# Patient Record
Sex: MALE | Race: WHITE | NOT HISPANIC OR LATINO | Employment: UNEMPLOYED | URBAN - METROPOLITAN AREA
[De-identification: names, ages, dates, MRNs, and addresses within clinical notes are randomized per-mention and may not be internally consistent; named-entity substitution may affect disease eponyms.]

---
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
## 2021-08-22 NOTE — TELEPHONE ENCOUNTER
----- Message from Myron Schulte II, DO sent at 7/29/2020 12:22 PM EDT -----  Please call pt and let him know I would like for him to come back in and discuss MRI results. He had a fairly significant dislocation, with damage to the ligament as we discussed.   Please fill your eyedrop prescription and start them as soon as possible.    A referral has been placed into the computer system for you to be seen in the eye clinic.  They should call you in the next 24 hours to schedule your appointment to be seen in the next 1 to 2 days.  If they don't call you please call them at [facility] (phone: [phone]).